# Patient Record
Sex: MALE | Race: WHITE
[De-identification: names, ages, dates, MRNs, and addresses within clinical notes are randomized per-mention and may not be internally consistent; named-entity substitution may affect disease eponyms.]

---

## 2021-01-08 ENCOUNTER — HOSPITAL ENCOUNTER (EMERGENCY)
Dept: HOSPITAL 61 - ER | Age: 66
Discharge: HOME | End: 2021-01-08
Payer: MEDICARE

## 2021-01-08 VITALS — DIASTOLIC BLOOD PRESSURE: 88 MMHG | SYSTOLIC BLOOD PRESSURE: 135 MMHG

## 2021-01-08 VITALS — WEIGHT: 235.67 LBS | BODY MASS INDEX: 33.74 KG/M2 | HEIGHT: 70 IN

## 2021-01-08 DIAGNOSIS — Z91.018: ICD-10-CM

## 2021-01-08 DIAGNOSIS — Z88.1: ICD-10-CM

## 2021-01-08 DIAGNOSIS — E78.00: ICD-10-CM

## 2021-01-08 DIAGNOSIS — Z98.890: ICD-10-CM

## 2021-01-08 DIAGNOSIS — R11.2: Primary | ICD-10-CM

## 2021-01-08 DIAGNOSIS — R10.9: ICD-10-CM

## 2021-01-08 DIAGNOSIS — K21.9: ICD-10-CM

## 2021-01-08 DIAGNOSIS — R19.7: ICD-10-CM

## 2021-01-08 DIAGNOSIS — Z20.818: ICD-10-CM

## 2021-01-08 LAB
% BANDS: 3 % (ref 0–9)
% LYMPHS: 18 % (ref 24–48)
% MONOS: 5 % (ref 0–10)
% SEGS: 63 % (ref 35–66)
ALBUMIN SERPL-MCNC: 2 G/DL (ref 3.4–5)
ALBUMIN/GLOB SERPL: 0.8 {RATIO} (ref 1–1.7)
ALP SERPL-CCNC: 66 U/L (ref 46–116)
ALT SERPL-CCNC: 37 U/L (ref 16–63)
ANION GAP SERPL CALC-SCNC: 10 MMOL/L (ref 6–14)
AST SERPL-CCNC: 28 U/L (ref 15–37)
BASOPHILS # BLD AUTO: 0 X10^3/UL (ref 0–0.2)
BASOPHILS NFR BLD AUTO: 2 % (ref 0–3)
BASOPHILS NFR BLD: 0 % (ref 0–3)
BILIRUB SERPL-MCNC: 0.3 MG/DL (ref 0.2–1)
BUN SERPL-MCNC: 14 MG/DL (ref 8–26)
BUN/CREAT SERPL: 14 (ref 6–20)
CALCIUM SERPL-MCNC: 7.9 MG/DL (ref 8.5–10.1)
CHLORIDE SERPL-SCNC: 106 MMOL/L (ref 98–107)
CO2 SERPL-SCNC: 21 MMOL/L (ref 21–32)
CREAT SERPL-MCNC: 1 MG/DL (ref 0.7–1.3)
EOSINOPHIL NFR BLD AUTO: 9 % (ref 0–5)
EOSINOPHIL NFR BLD: 1.1 X10^3/UL (ref 0–0.7)
EOSINOPHIL NFR BLD: 9 % (ref 0–3)
ERYTHROCYTE [DISTWIDTH] IN BLOOD BY AUTOMATED COUNT: 14.7 % (ref 11.5–14.5)
GFR SERPLBLD BASED ON 1.73 SQ M-ARVRAT: 74.8 ML/MIN
GLUCOSE SERPL-MCNC: 117 MG/DL (ref 70–99)
HCT VFR BLD CALC: 50.4 % (ref 39–53)
HGB BLD-MCNC: 16.9 G/DL (ref 13–17.5)
INFLUENZA A PATIENT: NEGATIVE
INFLUENZA B PATIENT: NEGATIVE
LIPASE: 87 U/L (ref 73–393)
LYMPHOCYTES # BLD: 2.8 X10^3/UL (ref 1–4.8)
LYMPHOCYTES NFR BLD AUTO: 23 % (ref 24–48)
MCH RBC QN AUTO: 29 PG (ref 25–35)
MCHC RBC AUTO-ENTMCNC: 34 G/DL (ref 31–37)
MCV RBC AUTO: 88 FL (ref 79–100)
MONO #: 0.9 X10^3/UL (ref 0–1.1)
MONOCYTES NFR BLD: 8 % (ref 0–9)
NEUT #: 7.2 X10^3/UL (ref 1.8–7.7)
NEUTROPHILS NFR BLD AUTO: 60 % (ref 31–73)
PLATELET # BLD AUTO: 293 X10^3/UL (ref 140–400)
PLATELET # BLD EST: ADEQUATE 10*3/UL
POLYCHROMASIA BLD QL SMEAR: SLIGHT
POTASSIUM SERPL-SCNC: 3.2 MMOL/L (ref 3.5–5.1)
PROT SERPL-MCNC: 4.5 G/DL (ref 6.4–8.2)
PROTHROMBIN TIME: 13.4 SEC (ref 11.7–14)
RBC # BLD AUTO: 5.74 X10^6/UL (ref 4.3–5.7)
SODIUM SERPL-SCNC: 137 MMOL/L (ref 136–145)
WBC # BLD AUTO: 12 X10^3/UL (ref 4–11)

## 2021-01-08 PROCEDURE — 71045 X-RAY EXAM CHEST 1 VIEW: CPT

## 2021-01-08 PROCEDURE — 93005 ELECTROCARDIOGRAM TRACING: CPT

## 2021-01-08 PROCEDURE — C9803 HOPD COVID-19 SPEC COLLECT: HCPCS

## 2021-01-08 PROCEDURE — 84484 ASSAY OF TROPONIN QUANT: CPT

## 2021-01-08 PROCEDURE — 74177 CT ABD & PELVIS W/CONTRAST: CPT

## 2021-01-08 PROCEDURE — U0003 INFECTIOUS AGENT DETECTION BY NUCLEIC ACID (DNA OR RNA); SEVERE ACUTE RESPIRATORY SYNDROME CORONAVIRUS 2 (SARS-COV-2) (CORONAVIRUS DISEASE [COVID-19]), AMPLIFIED PROBE TECHNIQUE, MAKING USE OF HIGH THROUGHPUT TECHNOLOGIES AS DESCRIBED BY CMS-2020-01-R: HCPCS

## 2021-01-08 PROCEDURE — 99285 EMERGENCY DEPT VISIT HI MDM: CPT

## 2021-01-08 PROCEDURE — 96374 THER/PROPH/DIAG INJ IV PUSH: CPT

## 2021-01-08 PROCEDURE — 36415 COLL VENOUS BLD VENIPUNCTURE: CPT

## 2021-01-08 PROCEDURE — 83690 ASSAY OF LIPASE: CPT

## 2021-01-08 PROCEDURE — 85025 COMPLETE CBC W/AUTO DIFF WBC: CPT

## 2021-01-08 PROCEDURE — 96361 HYDRATE IV INFUSION ADD-ON: CPT

## 2021-01-08 PROCEDURE — 80053 COMPREHEN METABOLIC PANEL: CPT

## 2021-01-08 PROCEDURE — 85007 BL SMEAR W/DIFF WBC COUNT: CPT

## 2021-01-08 PROCEDURE — 85610 PROTHROMBIN TIME: CPT

## 2021-01-08 PROCEDURE — 87804 INFLUENZA ASSAY W/OPTIC: CPT

## 2021-01-08 NOTE — RAD
Study: CT abdomen/pelvis with intravenous contrast



Indication: Abdominal pain. Vomiting. Diarrhea.



Comparison: None.



Technique: Helical CT imaging performed of the abdomen and pelvis after the intravenous administratio
n of 75 cc Omnipaque 300 contrast. Sagittal and coronal reformats were obtained. 



One or more of the following individualized dose reduction techniques were utilized for this examinat
ion:  



1. Automated exposure control

2. Adjustment of the mA and/or kV according to patient size

3. Use of iterative reconstruction technique.



Findings:



Punctate right middle lobe nodule not meeting size criteria for dedicated follow-up. No acute abnorma
lity involving the visualized mediastinal contents.



No focal hepatic parenchymal abnormality. Distended gallbladder but without evidence for acute cholec
ystitis. Nondilated biliary tree. No peripancreatic inflammation. Normal size of the spleen. No adren
al gland mass. Unremarkable kidneys and collecting system. Prominence of the prostate gland measuring
 4.9 cm transverse.



Incompletely formed stool throughout the colon typical of a diarrheal state. The proximal aspect of t
he appendix is dilated, image 32 series 4, but there is gas within the lumen distally and no surround
ing inflammation to suggest appendicitis. Fluid distention of small bowel. Several segments exhibit m
ildly increased mucosal enhancement. No pathologic dilatation or transitioning to collapse to suggest
 obstruction. Unremarkable stomach.



Mild aortic and iliac atheromatous plaque. No aneurysm or dissection. Scattered mildly prominent but 
not pathologically enlarged mesenteric lymph nodes are likely reactive. No retroperitoneal, iliac krystyna
in or inguinal adenopathy. No free fluid or pneumoperitoneum. No acute abnormality of the body wall s
oft tissues. Scattered body wall edema.



No acute or aggressive osseous process. Scattered degenerative and chronic findings with associated m
ostly mild osseous neural foraminal encroachment. No evidence for severe central canal stenosis.



Impression:



1.  Fluid distention of small bowel with some mucosal hyperenhancement at a few locations which could
 represent a mild enteritis. Associated diarrheal state with incompletely formed stool throughout the
 colon. No bowel obstruction, pneumatosis or perforation.

2.  Mildly distended gallbladder but without CT manifestations of acute cholecystitis.



Electronically signed by: OMID RETANA MD (1/8/2021 5:05 PM) [id number]

## 2021-01-08 NOTE — RAD
EXAM: Chest, single view.



HISTORY: Vomiting.



COMPARISON: None.



FINDINGS: A frontal view of the chest is obtained. There is no infiltrate, pleural effusion or pneumo
thorax. The heart is normal in size.



IMPRESSION: No acute pulmonary finding.



Electronically signed by: America Shepherd MD (1/8/2021 4:19 PM) [id number]

## 2021-01-08 NOTE — PHYS DOC
Past Medical History


Past Medical History:  GERD, High Cholesterol


Past Surgical History:  Other


Additional Past Surgical Histo:  RT SHOULDER


Smoking Status:  Never Smoker


Alcohol Use:  Occasionally





General Adult


EDM:


Chief Complaint:  NAUSEA/VOMITING/DIARRHA





HPI:


HPI:





Patient is a 65  year old male who presents with last 10 days has had diarrhea, 

abdominal cramps and vomiting.  He states he has been to [facility] twice and

they have done CTs and has been negative and he has had all these inconclusive 

results.  He states that his wife is bringing rate everywhere in the kitchen and

is getting on the plates and around the food.  He states he has been eating all 

the same plates.  Patient states he is wondering if he is ingesting raid.  

Patient was tested for Covid a month ago and it was negative.  He has a history 

of GERD and high cholesterol.  He rates his abdominal cramping a 3 out of 10.





Review of Systems:


Review of Systems:


Constitutional:   Denies fever or chills. []


Eyes:   Denies change in visual acuity. []


HENT:   Denies nasal congestion or sore throat. [] 


Respiratory:   Denies cough or shortness of breath. [] 


Cardiovascular:   Denies chest pain or edema. [] 


GI:   + abdominal pain, +nausea, +vomiting, denies bloody stools or +diarrhea. 

[] 


:  Denies dysuria. [] 


Musculoskeletal:   Denies back pain or joint pain. [] 


Integument:   Denies rash. [] 


Neurologic:   Denies headache, focal weakness or sensory changes. [] 


Endocrine:   Denies polyuria or polydipsia. [] 


Lymphatic:  Denies swollen glands. [] 


Psychiatric:  Denies depression or anxiety. []





Heart Score:


Risk Factors:


Risk Factors:  DM, Current or recent (<one month) smoker, HTN, HLP, family 

history of CAD, obesity.


Risk Scores:


Score 0 - 3:  2.5% MACE over next 6 weeks - Discharge Home


Score 4 - 6:  20.3% MACE over next 6 weeks - Admit for Clinical Observation


Score 7 - 10:  72.7% MACE over next 6 weeks - Early Invasive Strategies





Current Medications:





Current Medications








 Medications


  (Trade)  Dose


 Ordered  Sig/Dayana  Start Time


 Stop Time Status Last Admin


Dose Admin


 


 Iohexol


  (Omnipaque 300


 Mg/ml)  75 ml  1X  ONCE  21 16:45


 21 16:46 DC 21 16:46


75 ML


 


 Ondansetron HCl


  (Zofran)  4 mg  1X  ONCE  21 15:45


 21 16:39 DC 21 16:53


4 MG


 


 Sodium Chloride  1,000 ml @ 


 1,000 mls/hr  Q1H  21 15:45


 21 16:44 DC 21 16:53


1,000 MLS/HR











Allergies:


Allergies:





Allergies








Coded Allergies Type Severity Reaction Last Updated Verified


 


  amoxicillin Allergy Intermediate  21 Yes


 


  caffeine Adverse Reaction Intermediate  21 Yes











Physical Exam:


PE:





Constitutional: Well developed, well nourished, no acute distress, non-toxic 

appearance. []


HENT: Normocephalic, atraumatic, bilateral external ears normal, oropharynx mo

ist, no oral exudates, nose normal. []


Eyes: PERRLA, EOMI, conjunctiva normal, no discharge. [] 


Neck: Normal range of motion, no tenderness, supple, no stridor. [] 


Cardiovascular:Heart rate regular rhythm, no murmur []


Lungs & Thorax:  Bilateral breath sounds clear to auscultation []


Abdomen: Bowel sounds normal, soft, generalized tenderness, no masses, no 

pulsatile masses. [] 


Skin: Warm, dry, no erythema, no rash. [] 


Back: No tenderness, no CVA tenderness. [] 


Extremities: No tenderness, no cyanosis, no clubbing, ROM intact, no edema. [] 


Neurologic: Alert and oriented X 3, normal motor function, normal sensory 

function, no focal deficits noted. []


Psychologic: Affect normal, judgement normal, mood normal. []





Current Patient Data:


Labs:





                                Laboratory Tests








Test


 21


15:28


 


White Blood Count


 12.0 x10^3/uL


(4.0-11.0)  H


 


Red Blood Count


 5.74 x10^6/uL


(4.30-5.70)  H


 


Hemoglobin


 16.9 g/dL


(13.0-17.5)


 


Hematocrit


 50.4 %


(39.0-53.0)


 


Mean Corpuscular Volume


 88 fL ()





 


Mean Corpuscular Hemoglobin 29 pg (25-35)  


 


Mean Corpuscular Hemoglobin


Concent 34 g/dL


(31-37)


 


Red Cell Distribution Width


 14.7 %


(11.5-14.5)  H


 


Platelet Count


 293 x10^3/uL


(140-400)


 


Neutrophils (%) (Auto) 60 % (31-73)  


 


Lymphocytes (%) (Auto) 23 % (24-48)  L


 


Monocytes (%) (Auto) 8 % (0-9)  


 


Eosinophils (%) (Auto) 9 % (0-3)  H


 


Basophils (%) (Auto) 0 % (0-3)  


 


Neutrophils # (Auto)


 7.2 x10^3/uL


(1.8-7.7)


 


Lymphocytes # (Auto)


 2.8 x10^3/uL


(1.0-4.8)


 


Monocytes # (Auto)


 0.9 x10^3/uL


(0.0-1.1)


 


Eosinophils # (Auto)


 1.1 x10^3/uL


(0.0-0.7)  H


 


Basophils # (Auto)


 0.0 x10^3/uL


(0.0-0.2)


 


Platelet Estimate Pending  


 


Prothrombin Time


 13.4 SEC


(11.7-14.0)


 


Prothrombin Time INR 1.1 (0.8-1.1)  


 


Sodium Level


 137 mmol/L


(136-145)


 


Potassium Level


 3.2 mmol/L


(3.5-5.1)  L


 


Chloride Level


 106 mmol/L


()


 


Carbon Dioxide Level


 21 mmol/L


(21-32)


 


Anion Gap 10 (6-14)  


 


Blood Urea Nitrogen


 14 mg/dL


(8-26)


 


Creatinine


 1.0 mg/dL


(0.7-1.3)


 


Estimated GFR


(Cockcroft-Gault) 74.8  





 


BUN/Creatinine Ratio 14 (6-20)  


 


Glucose Level


 117 mg/dL


(70-99)  H


 


Calcium Level


 7.9 mg/dL


(8.5-10.1)  L


 


Total Bilirubin


 0.3 mg/dL


(0.2-1.0)


 


Aspartate Amino Transferase


(AST) 28 U/L (15-37)





 


Alanine Aminotransferase (ALT)


 37 U/L (16-63)





 


Alkaline Phosphatase


 66 U/L


()


 


Troponin I Quantitative


 < 0.017 ng/mL


(0.000-0.055)


 


Total Protein


 4.5 g/dL


(6.4-8.2)  L


 


Albumin


 2.0 g/dL


(3.4-5.0)  L


 


Albumin/Globulin Ratio


 0.8 (1.0-1.7)


L


 


Lipase


 87 U/L


()





                                Laboratory Tests


21 15:28








                                Laboratory Tests


21 15:28








Vital Signs:





                                   Vital Signs








  Date Time  Temp Pulse Resp B/P (MAP) Pulse Ox O2 Delivery O2 Flow Rate FiO2


 


21 15:04 97.3 100  135/88 (104) 99   





 97.3       


 


21 14:54   22   Room Air  











EKG:


EK AND READ BY DR JOSEPH AS SINUS RHYTHM AND NO STEMI[]





Radiology/Procedures:


Radiology/Procedures:


[]


Impression:


                            [place] [id number]


                                 [phone]


                                        


                                 IMAGING REPORT





                                     Signed





PATIENT: TRACY DUENAS  ACCOUNT: [id number]     MRN#: [id number]


: 1955           LOCATION: ER              AGE: 65


SEX: M                    EXAM DT: 21         ACCESSION#: [id number]


STATUS: REG ER            ORD. PHYSICIAN: ROBY DUNLAP


REASON: VOMITING


PROCEDURE: PORTABLE CHEST 1V





EXAM: Chest, single view.





HISTORY: Vomiting.





COMPARISON: None.





FINDINGS: A frontal view of the chest is obtained. There is no infiltrate, 

pleural effusion or pneumothorax. The heart is normal in size.





IMPRESSION: No acute pulmonary finding.





Electronically signed by: America Angeles MD (2021 4:19 PM) [id number]














DICTATED and SIGNED BY:     AMERICA ANGELES MD


DATE:     21 [id number] 0





                            [place] [id number] [place], [place] [id number] [phone]


                                        


                                 IMAGING REPORT





                                     Signed





PATIENT: TRACY DUENAS  ACCOUNT: [id number]     MRN#: [id number]


: 1955           LOCATION: ER              AGE: 65


SEX: M                    EXAM DT: 21         ACCESSION#: [id number]


STATUS: REG ER            ORD. PHYSICIAN: ROBY DUNLAP


REASON: ABD PAIN, VOMITING, DIARRHEA


PROCEDURE: CT ABD PELV W/ IV CONTRST ONLY





Study: CT abdomen/pelvis with intravenous contrast





Indication: Abdominal pain. Vomiting. Diarrhea.





Comparison: None.





Technique: Helical CT imaging performed of the abdomen and pelvis after the 

intravenous administration of 75 cc Omnipaque 300 contrast. Sagittal and coronal

 reformats were obtained. 





One or more of the following individualized dose reduction techniques were 

utilized for this examination:  





1. Automated exposure control


2. Adjustment of the mA and/or kV according to patient size


3. Use of iterative reconstruction technique.





Findings:





Punctate right middle lobe nodule not meeting size criteria for dedicated 

follow-up. No acute abnormality involving the visualized mediastinal contents.





No focal hepatic parenchymal abnormality. Distended gallbladder but without 

evidence for acute cholecystitis. Nondilated biliary tree. No peripancreatic in

flammation. Normal size of the spleen. No adrenal gland mass. Unremarkable 

kidneys and collecting system. Prominence of the prostate gland measuring 4.9 cm

 transverse.





Incompletely formed stool throughout the colon typical of a diarrheal state. The

 proximal aspect of the appendix is dilated, image 32 series 4, but there is gas

 within the lumen distally and no surrounding inflammation to suggest a

ppendicitis. Fluid distention of small bowel. Several segments exhibit mildly 

increased mucosal enhancement. No pathologic dilatation or transitioning to 

collapse to suggest obstruction. Unremarkable stomach.





Mild aortic and iliac atheromatous plaque. No aneurysm or dissection. Scattered 

mildly prominent but not pathologically enlarged mesenteric lymph nodes are 

likely reactive. No retroperitoneal, iliac chain or inguinal adenopathy. No free

 fluid or pneumoperitoneum. No acute abnormality of the body wall soft tissues. 

Scattered body wall edema.





No acute or aggressive osseous process. Scattered degenerative and chronic 

findings with associated mostly mild osseous neural foraminal encroachment. No 

evidence for severe central canal stenosis.





Impression:





1.  Fluid distention of small bowel with some mucosal hyperenhancement at a few 

locations which could represent a mild enteritis. Associated diarrheal state 

with incompletely formed stool throughout the colon. No bowel obstruction, pn

eumatosis or perforation.


2.  Mildly distended gallbladder but without CT manifestations of acute ch

olecystitis.





Electronically signed by: OMID RETANA MD (2021 5:05 PM) [id number]














DICTATED and SIGNED BY:     OMID RETANA MD


DATE:     21 [id number] 0





Course & Med Decision Making:


Course & Med Decision Making


Pertinent Labs and Imaging studies reviewed. (See chart for details)





See HPI.  Susan RN called poison control and Poison control states that she does

 not feel like the grade sprain and his symptoms are related.  Abdomen is soft 

but has generalized tenderness.  Patient states he is keeping down food and 

fluids but if he does vomited after eating.  Alert and oriented x4.  Speaks in 

full complete sentences.  Ambulatory with a steady gait.  Skin pink warm and 

dry.  Patient will be given 20 of potassium in the ED and Zofran.





[]





Dragon Disclaimer:


Dragon Disclaimer:


This electronic medical record was generated, in whole or in part, using a voice

 recognition dictation system.





Departure


Departure


Impression:  


   Primary Impression:  


   Nausea & vomiting


   Qualified Codes:  R11.2 - Nausea with vomiting, unspecified


   Additional Impression:  


   Diarrhea


   Qualified Codes:  R19.7 - Diarrhea, unspecified


Disposition:  01 DC HOME SELF CARE/HOMELESS


Condition:  STABLE


Referrals:  


TEE ARRIAGA (PCP)








NOA OAKLEY MD


Patient Instructions:  Diarrhea, Nausea and Vomiting, Easy-to-Read





Additional Instructions:  


Follow-up with your primary care soon as possible.  I have also referred you to 

a gastrointestinal doctor.  Continue eating light meals and drinking plenty of 

fluids to stay hydrated.  Take medications as prescribed.


Scripts


Famotidine (PEPCID) 20 Mg Tablet


20 MG PO BID, #20 TAB


   Prov: ROBY DUNLAP APRN         21 


Ondansetron (ONDANSETRON ODT) 4 Mg Tab.rapdis


1 TAB PO PRN Q6-8HRS, #16 TAB


   Prov: ROBY DUNLAP APRN         21











ROBY DUNLAP APRN             2021 17:10

## 2021-01-10 NOTE — EKG
[facility]

              [address] [place] [address]

Test Date:    2021               Test Time:    17:15:29

Pat Name:     TRACY DUENAS            Department:   

Patient ID:   [id number]           Room:          

Gender:       M                        Technician:   

:          1955               Requested By: ROBY DUNLAP

Order Number: [id number]           Reading MD:     

                                 Measurements

Intervals                              Axis          

Rate:         91                       P:            42

RI:           162                      QRS:          -24

QRSD:         92                       T:            77

QT:           376                                    

QTc:          464                                    

                           Interpretive Statements

SINUS RHYTHM

VENTRICULAR PREMATURE COMPLEX(ES)

LEFTWARD AXIS

T ABNORMALITY IN HIGH LATERAL LEADS

ABNORMAL ECG

RI6.02

No previous ECG available for comparison